# Patient Record
Sex: MALE | Race: WHITE | URBAN - METROPOLITAN AREA
[De-identification: names, ages, dates, MRNs, and addresses within clinical notes are randomized per-mention and may not be internally consistent; named-entity substitution may affect disease eponyms.]

---

## 2021-02-22 ENCOUNTER — OFFICE VISIT (OUTPATIENT)
Dept: URBAN - METROPOLITAN AREA CLINIC 71 | Facility: CLINIC | Age: 31
End: 2021-02-22
Payer: COMMERCIAL

## 2021-02-22 DIAGNOSIS — E11.3293 TYPE 2 DIABETES MELLITUS WITH MILD NONPROLIFERATIVE DIABETIC RETINOPATHY WITHOUT MACULAR EDEMA, BILATERAL: Primary | ICD-10-CM

## 2021-02-22 DIAGNOSIS — H25.13 AGE-RELATED NUCLEAR CATARACT, BILATERAL: ICD-10-CM

## 2021-02-22 PROCEDURE — 92004 COMPRE OPH EXAM NEW PT 1/>: CPT | Performed by: OPHTHALMOLOGY

## 2021-02-22 ASSESSMENT — INTRAOCULAR PRESSURE
OD: 17
OS: 18

## 2021-02-22 NOTE — IMPRESSION/PLAN
Impression: Age-related nuclear cataract, bilateral: H25.13. Plan: trace- early stages. will continue to monitor.

## 2021-02-22 NOTE — IMPRESSION/PLAN
Impression: Type 2 diabetes mellitus with mild nonproliferative diabetic retinopathy without macular edema, bilateral: X59.1873. Plan: Optos ordered today.  
stable- will r/c in 1 yr DFE annual

## 2021-03-25 ENCOUNTER — OFFICE VISIT (OUTPATIENT)
Dept: URBAN - METROPOLITAN AREA CLINIC 71 | Facility: CLINIC | Age: 31
End: 2021-03-25

## 2021-03-25 DIAGNOSIS — H52.11 MYOPIA, RIGHT EYE: Primary | ICD-10-CM

## 2021-03-25 ASSESSMENT — INTRAOCULAR PRESSURE
OS: 19
OD: 18

## 2021-03-25 ASSESSMENT — VISUAL ACUITY
OD: 20/20
OS: 20/20

## 2021-03-25 NOTE — IMPRESSION/PLAN
Impression: Myopia, right eye: H52.11. Plan: Myopia or nearsightedness develops during school age and is a result of the eyeball being too long, or the cornea having too much curvature. Patients usually complain of not being able to see or read distant objects, such as chalkboard writing, TV screen or movies. Myopia often progresses until patients reach their late twenties or early thirties. Myopia can be managed with corrective eye wear, such as eyeglasses or contacts to correct vision. New glasses RX given today. Continue to follow for annual vision exams.

## 2022-03-09 ENCOUNTER — OFFICE VISIT (OUTPATIENT)
Dept: URBAN - METROPOLITAN AREA CLINIC 71 | Facility: CLINIC | Age: 32
End: 2022-03-09
Payer: COMMERCIAL

## 2022-03-09 DIAGNOSIS — E11.9 TYPE 2 DIABETES MELLITUS WITHOUT COMPLICATIONS: Primary | ICD-10-CM

## 2022-03-09 PROCEDURE — 99214 OFFICE O/P EST MOD 30 MIN: CPT | Performed by: OPHTHALMOLOGY

## 2022-03-09 PROCEDURE — 92134 CPTRZ OPH DX IMG PST SGM RTA: CPT | Performed by: OPHTHALMOLOGY

## 2022-03-09 ASSESSMENT — INTRAOCULAR PRESSURE
OS: 18
OD: 16

## 2022-03-09 NOTE — IMPRESSION/PLAN
Impression: Type 2 diabetes mellitus without complications: Z84.7. Plan: The clinical exam is consistent with Type 2 DM without retinopathy. The patient was advised to maintain tight blood sugar, blood pressure, and lipid control, and to see us again in 1 year for a repeat dilated fundus examination. The patient was also advised to schedule an appointment with a primary care physician for a diabetic evaluation and counseling to avoid the systemic complications of diabetes.

## 2023-03-03 ENCOUNTER — OFFICE VISIT (OUTPATIENT)
Dept: URBAN - METROPOLITAN AREA CLINIC 71 | Facility: CLINIC | Age: 33
End: 2023-03-03
Payer: COMMERCIAL

## 2023-03-03 DIAGNOSIS — E10.9 TYPE 1 DIABETES MELLITUS WITHOUT COMPLICATIONS: Primary | ICD-10-CM

## 2023-03-03 PROCEDURE — 99212 OFFICE O/P EST SF 10 MIN: CPT | Performed by: OPHTHALMOLOGY

## 2023-03-03 ASSESSMENT — INTRAOCULAR PRESSURE
OD: 17
OS: 14